# Patient Record
Sex: MALE | Race: WHITE | NOT HISPANIC OR LATINO | URBAN - METROPOLITAN AREA
[De-identification: names, ages, dates, MRNs, and addresses within clinical notes are randomized per-mention and may not be internally consistent; named-entity substitution may affect disease eponyms.]

---

## 2019-07-31 ENCOUNTER — APPOINTMENT (RX ONLY)
Dept: URBAN - METROPOLITAN AREA CLINIC 51 | Facility: CLINIC | Age: 74
Setting detail: DERMATOLOGY
End: 2019-07-31

## 2019-07-31 DIAGNOSIS — L73.9 FOLLICULAR DISORDER, UNSPECIFIED: ICD-10-CM

## 2019-07-31 DIAGNOSIS — L57.0 ACTINIC KERATOSIS: ICD-10-CM

## 2019-07-31 DIAGNOSIS — L85.3 XEROSIS CUTIS: ICD-10-CM

## 2019-07-31 DIAGNOSIS — L738 OTHER SPECIFIED DISEASES OF HAIR AND HAIR FOLLICLES: ICD-10-CM

## 2019-07-31 DIAGNOSIS — L28.1 PRURIGO NODULARIS: ICD-10-CM

## 2019-07-31 DIAGNOSIS — L663 OTHER SPECIFIED DISEASES OF HAIR AND HAIR FOLLICLES: ICD-10-CM

## 2019-07-31 PROBLEM — H91.90 UNSPECIFIED HEARING LOSS, UNSPECIFIED EAR: Status: ACTIVE | Noted: 2019-07-31

## 2019-07-31 PROBLEM — E78.5 HYPERLIPIDEMIA, UNSPECIFIED: Status: ACTIVE | Noted: 2019-07-31

## 2019-07-31 PROBLEM — F32.9 MAJOR DEPRESSIVE DISORDER, SINGLE EPISODE, UNSPECIFIED: Status: ACTIVE | Noted: 2019-07-31

## 2019-07-31 PROBLEM — M12.9 ARTHROPATHY, UNSPECIFIED: Status: ACTIVE | Noted: 2019-07-31

## 2019-07-31 PROBLEM — L02.02 FURUNCLE OF FACE: Status: ACTIVE | Noted: 2019-07-31

## 2019-07-31 PROBLEM — L02.223 FURUNCLE OF CHEST WALL: Status: ACTIVE | Noted: 2019-07-31

## 2019-07-31 PROBLEM — I10 ESSENTIAL (PRIMARY) HYPERTENSION: Status: ACTIVE | Noted: 2019-07-31

## 2019-07-31 PROBLEM — L02.222 FURUNCLE OF BACK [ANY PART, EXCEPT BUTTOCK]: Status: ACTIVE | Noted: 2019-07-31

## 2019-07-31 PROCEDURE — ? TREATMENT REGIMEN

## 2019-07-31 PROCEDURE — ? COUNSELING

## 2019-07-31 PROCEDURE — ? LIQUID NITROGEN

## 2019-07-31 PROCEDURE — ? PRESCRIPTION

## 2019-07-31 PROCEDURE — 17000 DESTRUCT PREMALG LESION: CPT

## 2019-07-31 PROCEDURE — 99202 OFFICE O/P NEW SF 15 MIN: CPT | Mod: 25

## 2019-07-31 RX ORDER — DOXYCYCLINE HYCLATE 100 MG/1
TABLET, COATED ORAL BID
Qty: 60 | Refills: 1 | Status: ERX | COMMUNITY
Start: 2019-07-31

## 2019-07-31 RX ORDER — FLUOROURACIL 2 G/40G
CREAM TOPICAL
Qty: 40 | Refills: 0 | Status: ERX | COMMUNITY
Start: 2019-07-31

## 2019-07-31 RX ORDER — BETAMETHASONE DIPROPIONATE 0.5 MG/G
CREAM TOPICAL
Qty: 50 | Refills: 2 | Status: ERX | COMMUNITY
Start: 2019-07-31

## 2019-07-31 RX ADMIN — BETAMETHASONE DIPROPIONATE: 0.5 CREAM TOPICAL at 00:00

## 2019-07-31 RX ADMIN — DOXYCYCLINE HYCLATE: 100 TABLET, COATED ORAL at 00:00

## 2019-07-31 RX ADMIN — FLUOROURACIL: 2 CREAM TOPICAL at 00:00

## 2019-07-31 ASSESSMENT — LOCATION SIMPLE DESCRIPTION DERM
LOCATION SIMPLE: LEFT THIGH
LOCATION SIMPLE: LEFT CHEEK
LOCATION SIMPLE: RIGHT FOREARM
LOCATION SIMPLE: CHEST
LOCATION SIMPLE: RIGHT UPPER BACK
LOCATION SIMPLE: RIGHT THIGH
LOCATION SIMPLE: RIGHT FOREHEAD
LOCATION SIMPLE: RIGHT CHEEK
LOCATION SIMPLE: LEFT SCALP
LOCATION SIMPLE: LEFT FOREARM

## 2019-07-31 ASSESSMENT — SEVERITY ASSESSMENT: SEVERITY: MILD

## 2019-07-31 ASSESSMENT — LOCATION DETAILED DESCRIPTION DERM
LOCATION DETAILED: RIGHT SUPERIOR MEDIAL UPPER BACK
LOCATION DETAILED: LEFT MEDIAL FRONTAL SCALP
LOCATION DETAILED: RIGHT SUPERIOR MEDIAL FOREHEAD
LOCATION DETAILED: LEFT PROXIMAL DORSAL FOREARM
LOCATION DETAILED: LEFT ANTERIOR PROXIMAL THIGH
LOCATION DETAILED: RIGHT ANTERIOR PROXIMAL THIGH
LOCATION DETAILED: RIGHT INFERIOR MEDIAL BUCCAL CHEEK
LOCATION DETAILED: STERNUM
LOCATION DETAILED: RIGHT PROXIMAL DORSAL FOREARM
LOCATION DETAILED: LEFT INFERIOR MEDIAL BUCCAL CHEEK

## 2019-07-31 ASSESSMENT — LOCATION ZONE DERM
LOCATION ZONE: LEG
LOCATION ZONE: FACE
LOCATION ZONE: TRUNK
LOCATION ZONE: SCALP
LOCATION ZONE: ARM

## 2019-07-31 ASSESSMENT — BSA RASH: BSA RASH: 10

## 2019-07-31 ASSESSMENT — PAIN INTENSITY VAS: HOW INTENSE IS YOUR PAIN 0 BEING NO PAIN, 10 BEING THE MOST SEVERE PAIN POSSIBLE?: NO PAIN

## 2019-07-31 NOTE — PROCEDURE: LIQUID NITROGEN
Post-Care Instructions: I reviewed with the patient in detail post-care instructions. Patient is to wear sunprotection, and avoid picking at any of the treated lesions. Pt may shower as normal, and apply Vaseline to crusted or scabbing areas.
Consent: The patient's verbal consent was obtained including but not limited to risks of crusting, scabbing, blistering, scarring, darker or lighter pigmentary change, recurrence, incomplete removal and infection.
Number Of Freeze-Thaw Cycles: 2 freeze-thaw cycles
Duration Of Freeze Thaw-Cycle (Seconds): 5
Render Note In Bullet Format When Appropriate: No
Render Post-Care Instructions In Note?: yes
Detail Level: Zone

## 2019-10-31 ENCOUNTER — APPOINTMENT (RX ONLY)
Dept: URBAN - METROPOLITAN AREA CLINIC 51 | Facility: CLINIC | Age: 74
Setting detail: DERMATOLOGY
End: 2019-10-31

## 2019-10-31 DIAGNOSIS — L57.0 ACTINIC KERATOSIS: ICD-10-CM

## 2019-10-31 DIAGNOSIS — L663 OTHER SPECIFIED DISEASES OF HAIR AND HAIR FOLLICLES: ICD-10-CM | Status: RESOLVING

## 2019-10-31 DIAGNOSIS — L28.1 PRURIGO NODULARIS: ICD-10-CM

## 2019-10-31 DIAGNOSIS — L73.9 FOLLICULAR DISORDER, UNSPECIFIED: ICD-10-CM | Status: RESOLVING

## 2019-10-31 DIAGNOSIS — L738 OTHER SPECIFIED DISEASES OF HAIR AND HAIR FOLLICLES: ICD-10-CM | Status: RESOLVING

## 2019-10-31 PROBLEM — L02.821 FURUNCLE OF HEAD [ANY PART, EXCEPT FACE]: Status: ACTIVE | Noted: 2019-10-31

## 2019-10-31 PROBLEM — L02.02 FURUNCLE OF FACE: Status: ACTIVE | Noted: 2019-10-31

## 2019-10-31 PROCEDURE — ? COUNSELING

## 2019-10-31 PROCEDURE — ? TREATMENT REGIMEN

## 2019-10-31 PROCEDURE — ? PRESCRIPTION

## 2019-10-31 PROCEDURE — 99213 OFFICE O/P EST LOW 20 MIN: CPT

## 2019-10-31 RX ORDER — CLOBETASOL PROPIONATE 0.5 MG/G
OINTMENT TOPICAL
Qty: 60 | Refills: 0 | Status: ERX | COMMUNITY
Start: 2019-10-31

## 2019-10-31 RX ORDER — CYPROHEPTADINE HYDROCHLORIDE 4 MG/1
TABLET ORAL
Qty: 30 | Refills: 0 | Status: ERX | COMMUNITY
Start: 2019-10-31

## 2019-10-31 RX ADMIN — CYPROHEPTADINE HYDROCHLORIDE: 4 TABLET ORAL at 14:52

## 2019-10-31 RX ADMIN — CLOBETASOL PROPIONATE: 0.5 OINTMENT TOPICAL at 13:27

## 2019-10-31 ASSESSMENT — LOCATION DETAILED DESCRIPTION DERM
LOCATION DETAILED: RIGHT INFERIOR POSTAURICULAR SKIN
LOCATION DETAILED: LEFT INFERIOR CENTRAL MALAR CHEEK
LOCATION DETAILED: RIGHT PROXIMAL LATERAL POSTERIOR THIGH
LOCATION DETAILED: LEFT PROXIMAL POSTERIOR THIGH
LOCATION DETAILED: RIGHT INFERIOR CENTRAL MALAR CHEEK
LOCATION DETAILED: LEFT MEDIAL FRONTAL SCALP

## 2019-10-31 ASSESSMENT — SEVERITY ASSESSMENT: SEVERITY: ALMOST CLEAR

## 2019-10-31 ASSESSMENT — LOCATION ZONE DERM
LOCATION ZONE: LEG
LOCATION ZONE: SCALP
LOCATION ZONE: FACE

## 2019-10-31 ASSESSMENT — LOCATION SIMPLE DESCRIPTION DERM
LOCATION SIMPLE: RIGHT CHEEK
LOCATION SIMPLE: POSTERIOR SCALP
LOCATION SIMPLE: LEFT POSTERIOR THIGH
LOCATION SIMPLE: LEFT CHEEK
LOCATION SIMPLE: LEFT SCALP
LOCATION SIMPLE: RIGHT POSTERIOR THIGH

## 2019-10-31 ASSESSMENT — PAIN INTENSITY VAS: HOW INTENSE IS YOUR PAIN 0 BEING NO PAIN, 10 BEING THE MOST SEVERE PAIN POSSIBLE?: NO PAIN

## 2020-12-21 ENCOUNTER — HOSPITAL ENCOUNTER (EMERGENCY)
Dept: HOSPITAL 75 - ER | Age: 75
Discharge: HOME | End: 2020-12-21
Payer: MEDICARE

## 2020-12-21 VITALS — WEIGHT: 199.52 LBS | HEIGHT: 65 IN | BODY MASS INDEX: 33.24 KG/M2

## 2020-12-21 VITALS — SYSTOLIC BLOOD PRESSURE: 132 MMHG | DIASTOLIC BLOOD PRESSURE: 62 MMHG

## 2020-12-21 DIAGNOSIS — S50.311A: Primary | ICD-10-CM

## 2020-12-21 DIAGNOSIS — Z88.0: ICD-10-CM

## 2020-12-21 DIAGNOSIS — R40.2410: ICD-10-CM

## 2020-12-21 DIAGNOSIS — Z88.8: ICD-10-CM

## 2020-12-21 DIAGNOSIS — Z23: ICD-10-CM
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
## 2020-12-21 DIAGNOSIS — S09.90XA: ICD-10-CM

## 2020-12-21 DIAGNOSIS — S00.91XA: ICD-10-CM

## 2020-12-21 PROCEDURE — 73080 X-RAY EXAM OF ELBOW: CPT

## 2020-12-21 PROCEDURE — 90715 TDAP VACCINE 7 YRS/> IM: CPT

## 2020-12-21 PROCEDURE — 72125 CT NECK SPINE W/O DYE: CPT

## 2020-12-21 PROCEDURE — 70450 CT HEAD/BRAIN W/O DYE: CPT

## 2020-12-21 NOTE — DIAGNOSTIC IMAGING REPORT
INDICATION: Fall.



Three views of the right elbow were obtained.



FINDINGS: The alignment is normal. There are mild degenerative

changes. There is no fracture or dislocation.



IMPRESSION: Mild degenerative changes otherwise unremarkable.



Dictated by: 



  Dictated on workstation # [id number]

## 2020-12-21 NOTE — ED FALL/INJURY
General


Chief Complaint:  Trauma-Non Activation


Stated Complaint:  FALL;HEAD INJ


Source:  patient


Exam Limitations:  no limitations





History of Present Illness


Date Seen by Provider:  Dec 21, 2020


Time Seen by Provider:  16:17


Initial Comments


To ER with reports of a fall while in the shower at [facility].  He has an abrasion to the back right parietal part of his head.

 No loss of consciousness.  No confusion no vomiting.  No neck pain.  He does 

have an abrasion to the dorsal aspect of the right elbow.


Occurred:  just prior to arrival


Severity:  moderate


Context:  slipped


Loss of Consciousness:  no loss of consciousness


Associated Symptoms (Fall):  Denies Symptoms





Allergies and Home Medications


Allergies


Coded Allergies:  


     Penicillins (Verified  Allergy, Unknown, 20)


     simvastatin (Verified  Allergy, Unknown, 20)





Patient Home Medication List


Home Medication List Reviewed:  Yes





Review of Systems


Review of Systems


Constitutional:  see HPI


Eyes:  No Symptoms Reported


Ears, Nose, Mouth, Throat:  no symptoms reported


Respiratory:  no symptoms reported


Cardiovascular:  no symptoms reported


Genitourinary:  no symptoms reported


Musculoskeletal:  no symptoms reported


Skin:  no symptoms reported


Psychiatric/Neurological:  No Symptoms Reported





Physical Exam


Vital Signs





Vital Signs - First Documented




















Capillary Refill :


Height, Weight, BMI


Height: '"


Weight: lbs. oz. kg;  BMI


Method:


General Appearance:  WD/WN, no apparent distress, other (Alert and oriented 

denies headache denies nausea denies dizziness.  Very talkative and jovial.)


Neck:  non-tender, full range of motion


Respiratory:  no respiratory distress, no accessory muscle use


Gastrointestinal:  normal bowel sounds, soft


Extremities:  normal range of motion, non-tender, other (Normal flexion and 

extension at the right elbow without pain but there is an abrasion over the 

olecranon process.)


Neurologic/Psychiatric:  alert, normal mood/affect, oriented x 3


Skin:  normal color, warm/dry





Miguel Coma Score


Best Eye Response:  (4) Open Spontaneously


Best Verbal Response:  (5) Oriented


Best Motor Response:  (6) Obeys Commands


[place] Total:  15





Progress/Results/Core Measures


Results/Orders


My Orders





Orders - MARKEL CHAMBERLAIN


Ct Head/Cervical Spine Wo (20 16:14)


Elbow, Right, 3 Views (20 16:14)


Dipht,Pertuss(Acell),Tet Adult (Boostrix (20 16:15)





Medications Given in ED





Current Medications








 Medications  Dose


 Ordered  Sig/Arminda


 Route  Start Time


 Stop Time Status Last Admin


Dose Admin


 


 Diphtheria/


 Tetanus/Acell


 Pertussis  0.5 ml  ONCE ONCE


 IM  20 16:15


 20 16:17 DC 20 16:30


0.5 ML








Vital Signs/I&O











 20





 16:12 16:12 17:26


 


Temp 36.2 36.2 36.7


 


Pulse 80 80 88


 


Resp 18 18 18


 


B/P (MAP) 138/71 (93) 138/71 (93) 132/62


 


Pulse Ox 97 97 97


 


O2 Delivery Room Air Room Air Room Air











Departure


Communication (Admissions)


Family Conversation


NAME:   FUENTES JAMA


MED REC#:   [id number]


ACCOUNT#:   [id number]


PT STATUS:   REG ER


:   1945


PHYSICIAN:   MARKEL CHAMBERLAIN


ADMIT DATE:   20/ER


                                   ***Draft***


Date of Exam:20





CT HEAD/CERVICAL SPINE WO








PROCEDURE: CT head and CT cervical spine without contrast.





TECHNIQUE: Multiple contiguous axial images were obtained through


the brain and cervical spine without the use of intravenous


contrast. Sagittal and coronal reformations through the cervical


spine were then performed. Auto Exposure Controls were utilized


during the CT exam to meet ALARA standards for radiation dose


reduction. 





INDICATION: Fall with head and neck injury.





FINDINGS:





CT HEAD:





Ventricles and sulci are diffusely prominent. No intracranial


hemorrhage is identified. There is no abnormal mass effect or


shift of midline structures. Contusion is seen in the posterior


scalp region. Calvarium appears to be intact. There is mild mural


thickening within left frontal and maxillary sinuses.





IMPRESSION: Involutional findings in the brain without CT


evidence of acute intracranial abnormality.





CT CERVICAL SPINE:





There is loss of cervical lordosis. Disc space narrowing,


endplate spurring, and degenerative facet arthropathy are present


diffusely, however no acute fracture or malalignment is


identified.





IMPRESSION: Cervical spondylosis without acute fracture or


malalignment identified. Loss of cervical lordosis may be


secondary to muscle spasm or positioning.





  Dictated on workstation # [id number]








Dict:   20 1714


Trans:   20 1719


[facility] [phone]





Interpreted by:     VERO PACK MD


Electronically signed by:


NAME:   FUENTES JAMA


MED REC#:   [id number]


ACCOUNT#:   [id number]


PT STATUS:   REG ER


:   1945


PHYSICIAN:   MARKEL CHAMBERLAIN


ADMIT DATE:   20/ER


                                   ***Draft***


Date of Exam:20





ELBOW, RIGHT, 3 VIEWS








INDICATION: Fall.





Three views of the right elbow were obtained.





FINDINGS: The alignment is normal. There are mild degenerative


changes. There is no fracture or dislocation.





IMPRESSION: Mild degenerative changes otherwise unremarkable.





  Dictated on workstation # [id number]








Dict:   20 1656


Trans:   20 1659


[facility] [phone]





Interpreted by:     LIV SMITH MD


Electronically signed by:





Impression





   Primary Impression:  


   Abrasion


   Additional Impression:  


   Head injury


Disposition:  01 HOME, SELF-CARE


Condition:  Stable





Departure-Patient Inst.


Decision time for Depature:  16:19


Patient Instructions:  Closed Head Injury





Add. Discharge Instructions:  


.  Follow-up with his doctor next week.  Return to ER for any concerns.





All discharge instructions reviewed with patient and/or family. Voiced 

understanding.











MARKEL CHAMBERLAIN             Dec 21, 2020 16:19

## 2020-12-21 NOTE — NUR
[facility] ARE HERE AND PT WAS ESCORTED TO THEIR VEHICLE. 
DISCHARGE INSTRUCTIONS WERE GIVEN TO THEM. CARE WAS TRANSFERRED.

## 2020-12-21 NOTE — DIAGNOSTIC IMAGING REPORT
PROCEDURE: CT head and CT cervical spine without contrast.



TECHNIQUE: Multiple contiguous axial images were obtained through

the brain and cervical spine without the use of intravenous

contrast. Sagittal and coronal reformations through the cervical

spine were then performed. Auto Exposure Controls were utilized

during the CT exam to meet ALARA standards for radiation dose

reduction. 



INDICATION: Fall with head and neck injury.



FINDINGS:



CT HEAD:



Ventricles and sulci are diffusely prominent. No intracranial

hemorrhage is identified. There is no abnormal mass effect or

shift of midline structures. Contusion is seen in the posterior

scalp region. Calvarium appears to be intact. There is mild mural

thickening within left frontal and maxillary sinuses.



IMPRESSION: Involutional findings in the brain without CT

evidence of acute intracranial abnormality.



CT CERVICAL SPINE:



There is loss of cervical lordosis. Disc space narrowing,

endplate spurring, and degenerative facet arthropathy are present

diffusely, however no acute fracture or malalignment is

identified.



IMPRESSION: Cervical spondylosis without acute fracture or

malalignment identified. Loss of cervical lordosis may be

secondary to muscle spasm or positioning.



Dictated by: 



  Dictated on workstation # [id number]

## 2021-03-02 ENCOUNTER — HOSPITAL ENCOUNTER (OUTPATIENT)
Dept: HOSPITAL 75 - LABNPT | Age: 76
End: 2021-03-02
Attending: FAMILY MEDICINE
Payer: MEDICARE

## 2021-03-02 DIAGNOSIS — R82.998: Primary | ICD-10-CM

## 2021-03-02 LAB
APTT PPP: YELLOW S
BILIRUB UR QL STRIP: NEGATIVE
FIBRINOGEN PPP-MCNC: CLEAR MG/DL
GLUCOSE UR STRIP-MCNC: NEGATIVE MG/DL
KETONES UR QL STRIP: NEGATIVE
LEUKOCYTE ESTERASE UR QL STRIP: NEGATIVE
NITRITE UR QL STRIP: NEGATIVE
PH UR STRIP: 6 [PH] (ref 5–9)
PROT UR QL STRIP: NEGATIVE
SP GR UR STRIP: <=1.005 (ref 1.02–1.02)

## 2021-03-02 PROCEDURE — 81000 URINALYSIS NONAUTO W/SCOPE: CPT

## 2021-03-03 LAB
BACTERIA #/AREA URNS HPF: NEGATIVE /HPF
RBC #/AREA URNS HPF: (no result) /HPF
SQUAMOUS #/AREA URNS HPF: (no result) /HPF
WBC #/AREA URNS HPF: (no result) /HPF

## 2021-08-13 ENCOUNTER — HOSPITAL ENCOUNTER (EMERGENCY)
Dept: HOSPITAL 75 - ER | Age: 76
Discharge: HOME | End: 2021-08-13
Payer: MEDICARE

## 2021-08-13 VITALS — SYSTOLIC BLOOD PRESSURE: 158 MMHG | DIASTOLIC BLOOD PRESSURE: 87 MMHG

## 2021-08-13 VITALS — BODY MASS INDEX: 35.63 KG/M2 | HEIGHT: 65 IN | WEIGHT: 213.85 LBS

## 2021-08-13 DIAGNOSIS — E11.9: ICD-10-CM

## 2021-08-13 DIAGNOSIS — S00.81XA: Primary | ICD-10-CM

## 2021-08-13 DIAGNOSIS — Z23: ICD-10-CM

## 2021-08-13 DIAGNOSIS — S09.90XA: ICD-10-CM
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
## 2021-08-13 DIAGNOSIS — M25.551: ICD-10-CM

## 2021-08-13 DIAGNOSIS — I10: ICD-10-CM

## 2021-08-13 DIAGNOSIS — M25.552: ICD-10-CM

## 2021-08-13 PROCEDURE — 70486 CT MAXILLOFACIAL W/O DYE: CPT

## 2021-08-13 PROCEDURE — 90715 TDAP VACCINE 7 YRS/> IM: CPT

## 2021-08-13 PROCEDURE — 72125 CT NECK SPINE W/O DYE: CPT

## 2021-08-13 PROCEDURE — 73523 X-RAY EXAM HIPS BI 5/> VIEWS: CPT

## 2021-08-13 PROCEDURE — 70450 CT HEAD/BRAIN W/O DYE: CPT

## 2021-08-13 NOTE — DIAGNOSTIC IMAGING REPORT
PROCEDURE: CT head, face, and cervical spine without contrast.



TECHNIQUE: Multiple contiguous axial images were obtained through

the head, neck, and facial bones without the use of intravenous

contrast. Sagittal and coronal reformations through the cervical

spine and facial bones were also performed. Auto Exposure

Controls were utilized during the CT exam to meet ALARA standards

for radiation dose reduction. 



INDICATION:  Trauma with injuries to head, neck and face.



COMPARISON: 12/21/2020



CT HEAD:



Ventricles and sulci are prominent, however no hemorrhage is

identified. There is no abnormal mass effect or shift of midline

structures. Calvarium is intact. There is mural thickening

present within ethmoid air cells bilaterally and maxillary

sinuses, greater on the left. Mild degenerative findings are seen

in the left temporomandibular joint.



IMPRESSION: No acute intracranial abnormality is identified. Note

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

sinus disease, greater on the left.



Maxillofacial CT: There is no evidence of fracture. No organized

fluid collection is seen. Globes are intact. No intraorbital

hematoma seen. Note is made of surgical clips in the deep left

neck. There is fatty infiltration throughout the parotid salivary

glands.



IMPRESSION: Postoperative findings in the left neck without acute

maxillofacial abnormality.



CT cervical spine:



There is loss of normal cervical lordosis with evidence of old

fractures along the posterior cervical spine at the level of C5

and C6. Otherwise, there is diffuse disc space narrowing. No

acute fracture or malalignment is identified. Right lobe of

thyroid gland is small or absent.



IMPRESSION: Cervical spondylosis with loss of cervical lordosis

which may be secondary to muscle spasm or positioning.



Otherwise, no acute cervical spinal abnormality is identified.



Dictated by: 



  Dictated on workstation # [id number]

## 2021-08-13 NOTE — ED FALL/INJURY
General


Chief Complaint:  Lower Extremity


Stated Complaint:  FALL RT HIP PAIN


Nursing Triage Note:  


Pt arrival to ER via [facility] with complaint of R. Hip/Leg pain after witnessed 


fall. Pt complaining of pain, but has full range of motion and was able to 


ambulate on it post fall. Nursing staff states that patient is in normal 


mentation. Pt does have abrasion to top of forehead. Staff denies loss of 


consciousness.


Source:  EMS, nursing home records (ALL PMH IS FROM NURSING HOME RECORD)


Exam Limitations:  other (PT WITH DEMENTIA AND UNABLE TO GIVE RELIABLE 

INFORMATION)





History of Present Illness


Date Seen by Provider:  Aug 13, 2021


Time Seen by Provider:  00:10


Initial Comments


PT ARRIVES VIA EMS FROM [facility]


PT HAD A WITNESSED FALL AT NURSING HOME


PT HAD COMPLAINED OF RIGHT HIP PAIN AT THE TIME  OF THE FALL, BUT PT HAS BEEN 

ABLE TO AMBULATE AFTER HE FELL. 


PT WITH CHRONIC GAIT DISTURBANCE, GENERALIZED WEAKNESS


PT HAS LARGE ABRASION TO FOREHEAD AS WELL


NO REPORTED LOSS OF CONSCIOUSNESS





ON ASKING PT WHERE HE HURTS, HE POINTS TO LEFT HIP. PT HAS HAD PRIOR LEFT HIP 

REPLACEMENT





PT IS NOT ON ASPIRIN OR BLOOD THINNERS


PT RECEIVED ULTRAM AT 1903 FOR C/O RIGHT KNEE PAIN 





NO OTHER INFORMATION IS OBTAINABLE FROM PT


NURSING HOME HAS REPORTED THAT PT IS AT NORMAL BASELINE MENTATION





PCP: DR. ENCISO





Allergies and Home Medications


Allergies


Coded Allergies:  


     Penicillins (Verified  Allergy, Unknown, 12/21/20)


     simvastatin (Verified  Allergy, Unknown, 12/21/20)





Review of Systems


Review of Systems


Constitutional:  no symptoms reported


Musculoskeletal:  see HPI


Skin:  see HPI





Past Medical-Social-Family Hx


Past Medical History


Surgery/Hospitalization HX:  


LEFT HIP REPLACEMENT


Surgeries:  Yes


Joint Replacement, Orthopedic


Respiratory:  Yes (HX OF COVID-19 INFECTION)


Cardiac:  Yes


High Cholesterol, Hypertension


Neurological:  Yes


Dementia


Genitourinary:  No


Gastrointestinal:  No


Musculoskeletal:  Yes (LEFT HIP REPLACEMENT; GAIT DISTURBANCE;GEN 

WEAKNESS;FALLS)


Arthritis


Endocrine:  Yes


Diabetes, Non-Insulin dep


HEENT:  Yes (TONGUE CANCER)


Dysphagia


Cancer:  Yes


Oral





CANCER OF TONGUE


Psychosocial:  Yes (HALLUCINATIONS)


Schizophrenia, Depression


Integumentary:  No


Blood Disorders:  No





Physical Exam


Vital Signs





Vital Signs - First Documented








 8/13/21





 00:36


 


Temp 36.7


 


Pulse 80


 


Resp 20


 


B/P (MAP) 128/93 (105)


 


Pulse Ox 93


 


O2 Delivery Room Air





Capillary Refill : Less Than 3 Seconds


Height, Weight, BMI


Height: '"


Weight: lbs. oz. kg; 35.00 BMI


Method:


General Appearance:  WD/WN, no apparent distress


HEENT:  PERRL/EOMI, other (LARGE ABRASION TO FOREHEAD. NO ACTIVE BLEEDING, MILD 

SWELLING TO AREA. )


Neck:  non-tender, full range of motion


Cardiovascular:  regular rate, rhythm, no murmur


Respiratory:  chest non-tender, normal breath sounds, no respiratory distress, 

no accessory muscle use


Gastrointestinal:  non tender, soft


Back:  no CVA tenderness, no vertebral tenderness


Extremities:  pedal edema (1+ BILATERALLY), other (TENDERNESS TO LEFT HIP; NO 

DEFORMITY, NO SHORTENING / ROTATION, DISTAL PULSES INTACT. NO EXTERNAL EVIDENCE 

OF TRAUMA TO ARMS OR LEGS. )


Neurologic/Psychiatric:  no motor/sensory deficits, alert, normal mood/affect, 

other (CONFUSED TO PLACE/TIME/SITUATION)


Skin:  normal color, warm/dry, other (SUPERFICIAL ABRASIONS TO FOREHEAD)





Miguel Coma Score


Best Eye Response:  (4) Open Spontaneously


Best Verbal Response:  (4) Confused Conversation


Best Motor Response:  (6) Obeys Commands


[place] Total:  14





Progress/Results/Core Measures


Results/Orders


My Orders





Orders - VLAD GARCIA DO


Ct Head/Face/Cervical Wo (8/13/21 00:11)


Pelvis/Miguel A Hips 5> Views (8/13/21 00:17)


Dipht,Pertuss(Acell),Tet Adult (Boostrix (8/13/21 01:45)





Medications Given in ED





Current Medications








 Medications  Dose


 Ordered  Sig/Arminda


 Route  Start Time


 Stop Time Status Last Admin


Dose Admin


 


 Diphtheria/


 Tetanus/Acell


 Pertussis  0.5 ml  ONCE ONCE


 IM  8/13/21 01:45


 8/13/21 01:46 DC 8/13/21 01:49


0.5 ML








Vital Signs/I&O











 8/13/21





 00:36


 


Temp 36.7


 


Pulse 80


 


Resp 20


 


B/P (MAP) 128/93 (105)


 


Pulse Ox 93


 


O2 Delivery Room Air














Blood Pressure Mean:                    105











Diagnostic Imaging





Comments


XRAYS PELVIS AND BILATERAL HIPS--NO ACUTE PROCESS, HARDWARE IN PLACE LEFT HIP. 

PENDING RADIOLOGIST REVIEW





CT HEAD/MAXILLFACIALS/CERVICAL SPINE


   Reviewed:  Reviewed by Me





Departure


Impression





   Primary Impression:  


   S/P WITNESSED FALL


   Additional Impressions:  


   Closed head injury without loss of consciousness


   Abrasion of forehead


   Diphtheria-pertussis-tetanus (DPT) vaccination administered at current visit


   Hip pain





Departure-Patient Inst.


Decision time for Depature:  02:13


Referrals:  


MEGAN ENCISO DO (PCP/Family)


Primary Care Physician


Patient Instructions:  Abrasions ED, Closed Head Injury (DC), Diphtheria and 

Tetanus Toxoids, and Acellular Pertussis Vaccine, Hip Pain ED, Preventing Falls 

in the Older Adult





Add. Discharge Instructions:  


CONTINUE ALL HOME MEDICATIONS





FOLLOW UP WITH DR. ENCISO FOR FURTHER CARE





All discharge instructions reviewed with patient and/or family. Voiced 

understanding.











VLAD GARCIA DO                 Aug 13, 2021 00:55

## 2021-08-13 NOTE — DIAGNOSTIC IMAGING REPORT
INDICATION: Fall with pelvic and hip pain



AP view of the pelvis is obtained with coned AP and frog-leg

views of the hips.



There has been total left hip arthroplasty. Mild lower lumbar

spondylosis is present. There is no evidence of an acute

fracture. No orthopedic hardware complication is seen. There are

numerous calcified phleboliths seen in the pelvis.



IMPRESSION: Lower lumbar spondylosis and total left hip

arthroplasty. No acute abnormality is identified.



Dictated by: 



  Dictated on workstation # [id number]